# Patient Record
Sex: MALE | Race: OTHER | NOT HISPANIC OR LATINO
[De-identification: names, ages, dates, MRNs, and addresses within clinical notes are randomized per-mention and may not be internally consistent; named-entity substitution may affect disease eponyms.]

---

## 2020-08-22 ENCOUNTER — TRANSCRIPTION ENCOUNTER (OUTPATIENT)
Age: 25
End: 2020-08-22

## 2021-05-08 ENCOUNTER — APPOINTMENT (OUTPATIENT)
Dept: DISASTER EMERGENCY | Facility: OTHER | Age: 26
End: 2021-05-08

## 2021-05-10 ENCOUNTER — APPOINTMENT (OUTPATIENT)
Dept: DISASTER EMERGENCY | Facility: OTHER | Age: 26
End: 2021-05-10
Payer: COMMERCIAL

## 2021-05-10 PROCEDURE — 0012A: CPT

## 2022-02-13 ENCOUNTER — INPATIENT (INPATIENT)
Facility: HOSPITAL | Age: 27
LOS: 2 days | Discharge: HOME | End: 2022-02-16
Attending: INTERNAL MEDICINE | Admitting: INTERNAL MEDICINE
Payer: MEDICAID

## 2022-02-13 VITALS
WEIGHT: 229.94 LBS | HEIGHT: 72 IN | RESPIRATION RATE: 15 BRPM | TEMPERATURE: 98 F | DIASTOLIC BLOOD PRESSURE: 77 MMHG | OXYGEN SATURATION: 98 % | HEART RATE: 122 BPM | SYSTOLIC BLOOD PRESSURE: 115 MMHG

## 2022-02-13 DIAGNOSIS — I26.99 OTHER PULMONARY EMBOLISM WITHOUT ACUTE COR PULMONALE: ICD-10-CM

## 2022-02-13 LAB
ALBUMIN SERPL ELPH-MCNC: 3.4 G/DL — SIGNIFICANT CHANGE UP (ref 3.3–5)
ALP SERPL-CCNC: 87 U/L — SIGNIFICANT CHANGE UP (ref 40–120)
ALT FLD-CCNC: 58 U/L — SIGNIFICANT CHANGE UP (ref 12–78)
ANION GAP SERPL CALC-SCNC: 5 MMOL/L — SIGNIFICANT CHANGE UP (ref 5–17)
APPEARANCE UR: CLEAR — SIGNIFICANT CHANGE UP
APTT BLD: 32.3 SEC — SIGNIFICANT CHANGE UP (ref 27.5–35.5)
APTT BLD: >200 SEC — CRITICAL HIGH (ref 27.5–35.5)
AST SERPL-CCNC: 28 U/L — SIGNIFICANT CHANGE UP (ref 15–37)
BASOPHILS # BLD AUTO: 0.08 K/UL — SIGNIFICANT CHANGE UP (ref 0–0.2)
BASOPHILS NFR BLD AUTO: 0.7 % — SIGNIFICANT CHANGE UP (ref 0–2)
BILIRUB SERPL-MCNC: 0.7 MG/DL — SIGNIFICANT CHANGE UP (ref 0.2–1.2)
BILIRUB UR-MCNC: NEGATIVE — SIGNIFICANT CHANGE UP
BLD GP AB SCN SERPL QL: SIGNIFICANT CHANGE UP
BUN SERPL-MCNC: 13 MG/DL — SIGNIFICANT CHANGE UP (ref 7–23)
CALCIUM SERPL-MCNC: 9.4 MG/DL — SIGNIFICANT CHANGE UP (ref 8.5–10.1)
CHLORIDE SERPL-SCNC: 101 MMOL/L — SIGNIFICANT CHANGE UP (ref 96–108)
CK SERPL-CCNC: 115 U/L — SIGNIFICANT CHANGE UP (ref 26–308)
CO2 SERPL-SCNC: 30 MMOL/L — SIGNIFICANT CHANGE UP (ref 22–31)
COLOR SPEC: YELLOW — SIGNIFICANT CHANGE UP
CREAT SERPL-MCNC: 1.28 MG/DL — SIGNIFICANT CHANGE UP (ref 0.5–1.3)
D DIMER BLD IA.RAPID-MCNC: 5631 NG/ML DDU — HIGH
DIFF PNL FLD: NEGATIVE — SIGNIFICANT CHANGE UP
EOSINOPHIL # BLD AUTO: 0.39 K/UL — SIGNIFICANT CHANGE UP (ref 0–0.5)
EOSINOPHIL NFR BLD AUTO: 3.5 % — SIGNIFICANT CHANGE UP (ref 0–6)
FLUAV AG NPH QL: SIGNIFICANT CHANGE UP
FLUBV AG NPH QL: SIGNIFICANT CHANGE UP
GLUCOSE SERPL-MCNC: 87 MG/DL — SIGNIFICANT CHANGE UP (ref 70–99)
GLUCOSE UR QL: NEGATIVE MG/DL — SIGNIFICANT CHANGE UP
HCT VFR BLD CALC: 41.8 % — SIGNIFICANT CHANGE UP (ref 39–50)
HCT VFR BLD CALC: 44.7 % — SIGNIFICANT CHANGE UP (ref 39–50)
HGB BLD-MCNC: 13.9 G/DL — SIGNIFICANT CHANGE UP (ref 13–17)
HGB BLD-MCNC: 15 G/DL — SIGNIFICANT CHANGE UP (ref 13–17)
IMM GRANULOCYTES NFR BLD AUTO: 0.4 % — SIGNIFICANT CHANGE UP (ref 0–1.5)
INR BLD: 1.44 RATIO — HIGH (ref 0.88–1.16)
KETONES UR-MCNC: NEGATIVE — SIGNIFICANT CHANGE UP
LEUKOCYTE ESTERASE UR-ACNC: NEGATIVE — SIGNIFICANT CHANGE UP
LYMPHOCYTES # BLD AUTO: 1.66 K/UL — SIGNIFICANT CHANGE UP (ref 1–3.3)
LYMPHOCYTES # BLD AUTO: 14.9 % — SIGNIFICANT CHANGE UP (ref 13–44)
MCHC RBC-ENTMCNC: 28.9 PG — SIGNIFICANT CHANGE UP (ref 27–34)
MCHC RBC-ENTMCNC: 29.2 PG — SIGNIFICANT CHANGE UP (ref 27–34)
MCHC RBC-ENTMCNC: 33.3 G/DL — SIGNIFICANT CHANGE UP (ref 32–36)
MCHC RBC-ENTMCNC: 33.6 G/DL — SIGNIFICANT CHANGE UP (ref 32–36)
MCV RBC AUTO: 86.9 FL — SIGNIFICANT CHANGE UP (ref 80–100)
MCV RBC AUTO: 87.1 FL — SIGNIFICANT CHANGE UP (ref 80–100)
MONOCYTES # BLD AUTO: 0.71 K/UL — SIGNIFICANT CHANGE UP (ref 0–0.9)
MONOCYTES NFR BLD AUTO: 6.4 % — SIGNIFICANT CHANGE UP (ref 2–14)
NEUTROPHILS # BLD AUTO: 8.26 K/UL — HIGH (ref 1.8–7.4)
NEUTROPHILS NFR BLD AUTO: 74.1 % — SIGNIFICANT CHANGE UP (ref 43–77)
NITRITE UR-MCNC: NEGATIVE — SIGNIFICANT CHANGE UP
NRBC # BLD: 0 /100 WBCS — SIGNIFICANT CHANGE UP (ref 0–0)
NRBC # BLD: 0 /100 WBCS — SIGNIFICANT CHANGE UP (ref 0–0)
PH UR: 6 — SIGNIFICANT CHANGE UP (ref 5–8)
PLATELET # BLD AUTO: 318 K/UL — SIGNIFICANT CHANGE UP (ref 150–400)
PLATELET # BLD AUTO: 343 K/UL — SIGNIFICANT CHANGE UP (ref 150–400)
POTASSIUM SERPL-MCNC: 4.2 MMOL/L — SIGNIFICANT CHANGE UP (ref 3.5–5.3)
POTASSIUM SERPL-SCNC: 4.2 MMOL/L — SIGNIFICANT CHANGE UP (ref 3.5–5.3)
PROT SERPL-MCNC: 8.9 GM/DL — HIGH (ref 6–8.3)
PROT UR-MCNC: NEGATIVE MG/DL — SIGNIFICANT CHANGE UP
PROTHROM AB SERPL-ACNC: 16.4 SEC — HIGH (ref 10.6–13.6)
RBC # BLD: 4.81 M/UL — SIGNIFICANT CHANGE UP (ref 4.2–5.8)
RBC # BLD: 5.13 M/UL — SIGNIFICANT CHANGE UP (ref 4.2–5.8)
RBC # FLD: 11.4 % — SIGNIFICANT CHANGE UP (ref 10.3–14.5)
RBC # FLD: 11.4 % — SIGNIFICANT CHANGE UP (ref 10.3–14.5)
SARS-COV-2 RNA SPEC QL NAA+PROBE: SIGNIFICANT CHANGE UP
SODIUM SERPL-SCNC: 136 MMOL/L — SIGNIFICANT CHANGE UP (ref 135–145)
SP GR SPEC: 1.01 — SIGNIFICANT CHANGE UP (ref 1.01–1.02)
TROPONIN I, HIGH SENSITIVITY RESULT: 11.7 NG/L — SIGNIFICANT CHANGE UP
UROBILINOGEN FLD QL: NEGATIVE MG/DL — SIGNIFICANT CHANGE UP
WBC # BLD: 11.15 K/UL — HIGH (ref 3.8–10.5)
WBC # BLD: 11.23 K/UL — HIGH (ref 3.8–10.5)
WBC # FLD AUTO: 11.15 K/UL — HIGH (ref 3.8–10.5)
WBC # FLD AUTO: 11.23 K/UL — HIGH (ref 3.8–10.5)

## 2022-02-13 PROCEDURE — 74176 CT ABD & PELVIS W/O CONTRAST: CPT | Mod: 26,MA

## 2022-02-13 PROCEDURE — 93931 UPPER EXTREMITY STUDY: CPT | Mod: 26

## 2022-02-13 PROCEDURE — 71275 CT ANGIOGRAPHY CHEST: CPT | Mod: 26,MA

## 2022-02-13 PROCEDURE — 93010 ELECTROCARDIOGRAM REPORT: CPT

## 2022-02-13 PROCEDURE — 71046 X-RAY EXAM CHEST 2 VIEWS: CPT | Mod: 26

## 2022-02-13 PROCEDURE — 99285 EMERGENCY DEPT VISIT HI MDM: CPT

## 2022-02-13 RX ORDER — HEPARIN SODIUM 5000 [USP'U]/ML
INJECTION INTRAVENOUS; SUBCUTANEOUS
Qty: 25000 | Refills: 0 | Status: DISCONTINUED | OUTPATIENT
Start: 2022-02-13 | End: 2022-02-16

## 2022-02-13 RX ORDER — HEPARIN SODIUM 5000 [USP'U]/ML
INJECTION INTRAVENOUS; SUBCUTANEOUS
Qty: 25000 | Refills: 0 | Status: DISCONTINUED | OUTPATIENT
Start: 2022-02-13 | End: 2022-02-13

## 2022-02-13 RX ORDER — HEPARIN SODIUM 5000 [USP'U]/ML
8500 INJECTION INTRAVENOUS; SUBCUTANEOUS ONCE
Refills: 0 | Status: COMPLETED | OUTPATIENT
Start: 2022-02-13 | End: 2022-02-13

## 2022-02-13 RX ORDER — IBUPROFEN 200 MG
600 TABLET ORAL ONCE
Refills: 0 | Status: COMPLETED | OUTPATIENT
Start: 2022-02-13 | End: 2022-02-13

## 2022-02-13 RX ORDER — ACETAMINOPHEN 500 MG
650 TABLET ORAL EVERY 6 HOURS
Refills: 0 | Status: DISCONTINUED | OUTPATIENT
Start: 2022-02-13 | End: 2022-02-16

## 2022-02-13 RX ORDER — HEPARIN SODIUM 5000 [USP'U]/ML
8500 INJECTION INTRAVENOUS; SUBCUTANEOUS EVERY 6 HOURS
Refills: 0 | Status: DISCONTINUED | OUTPATIENT
Start: 2022-02-13 | End: 2022-02-16

## 2022-02-13 RX ORDER — HEPARIN SODIUM 5000 [USP'U]/ML
4000 INJECTION INTRAVENOUS; SUBCUTANEOUS EVERY 6 HOURS
Refills: 0 | Status: DISCONTINUED | OUTPATIENT
Start: 2022-02-13 | End: 2022-02-16

## 2022-02-13 RX ADMIN — Medication 600 MILLIGRAM(S): at 10:04

## 2022-02-13 RX ADMIN — HEPARIN SODIUM 1800 UNIT(S)/HR: 5000 INJECTION INTRAVENOUS; SUBCUTANEOUS at 19:49

## 2022-02-13 RX ADMIN — Medication 600 MILLIGRAM(S): at 09:03

## 2022-02-13 RX ADMIN — HEPARIN SODIUM 1800 UNIT(S)/HR: 5000 INJECTION INTRAVENOUS; SUBCUTANEOUS at 16:58

## 2022-02-13 RX ADMIN — HEPARIN SODIUM 0 UNIT(S)/HR: 5000 INJECTION INTRAVENOUS; SUBCUTANEOUS at 22:51

## 2022-02-13 RX ADMIN — HEPARIN SODIUM 1800 UNIT(S)/HR: 5000 INJECTION INTRAVENOUS; SUBCUTANEOUS at 14:08

## 2022-02-13 RX ADMIN — HEPARIN SODIUM 1800 UNIT(S)/HR: 5000 INJECTION INTRAVENOUS; SUBCUTANEOUS at 23:58

## 2022-02-13 RX ADMIN — HEPARIN SODIUM 8500 UNIT(S): 5000 INJECTION INTRAVENOUS; SUBCUTANEOUS at 14:07

## 2022-02-13 NOTE — ED ADULT NURSE REASSESSMENT NOTE - NS ED NURSE REASSESS COMMENT FT1
Call from Celest[place] in CT: scan was completed but toward end IV leaking from insertion site. Will evaluated on return to unit.
IV site patent and intact. No swelling or sign of infiltration. Second IV site initiated.
Lab results reviewed with patient by Dr. Wahl. Patient's questions and concerns addressed. Patient pending CTA.
Received patient from CT scan with warm compress on IV site on right arm. Patient denies any discomfort on site. CTA results explained by Dr. Wahl. Patient is placed on cardiac monitor. VS stable at this time.

## 2022-02-13 NOTE — H&P ADULT - NSHPPHYSICALEXAM_GEN_ALL_CORE
PHYSICAL EXAM:    GENERAL: NAD, well-groomed, well-developed  HEAD:  Atraumatic, Normocephalic  EYES: EOMI, PERRLA, conjunctiva and sclera clear  ENMT: No tonsillar erythema, exudates, or enlargement; Moist mucous membranes, , No lesions  NECK: Supple, No JVD, Normal thyroid  NERVOUS SYSTEM:  Alert & Oriented X4, ; Motor Strength 5/5 B/L upper and lower extremities; DTRs 2+ intact and symmetric  CHEST/LUNG: Clear  bilaterally; No rales, rhonchi, wheezing, or rubs  HEART: Regular rate and rhythm; No murmurs, rubs, or gallops  ABDOMEN: Soft, Nontender, Nondistended; no  masses Bowel sounds present  EXTREMITIES:  + Peripheral Pulses, No clubbing, cyanosis, or edema  LYMPH: No lymphadenopathy noted   RECTAL: deferred  SKIN: No rashes or lesions

## 2022-02-13 NOTE — H&P ADULT - HISTORY OF PRESENT ILLNESS
27 yo M w/PMH of Kidney Stones (2016), presents to the ED for right flank pain for the past x5 days. Pt reports pain is an intermittent, cramping pain, 6/10 in severity. Pt states the pain is similar to when he had kidney stones in the past. Denies any injuries, falls, or trauma. Pt took Advil for the pain last night and drank water. Pt also reports having intermittent episodes of increased pain in the same area. Denies any dysuria, hematuria, or fever, CP, SOB, or vomiting.  reported  rt  sided  pleuritic  CP  underwent  CT  angio  which  was  +  for  PE  admitted  for further w/u  and Rx

## 2022-02-13 NOTE — ED PROVIDER NOTE - OBJECTIVE STATEMENT
25 yo M w/PMH of Kidney Stones (2016), presents to the ED for right flank pain for the past x5 days. Pt reports pain is an intermittent, cramping pain, 6/10 in severity. Pt states the pain is similar to when he had kidney stones in the past. Denies any injuries, falls, or trauma. Pt took Advil for the pain last night and drank water. Pt also reports having intermittent episodes of increased pain in the same area. Denies any dysuria, hematuria, or fever, CP, SOB, or vomiting.

## 2022-02-13 NOTE — ED PROVIDER NOTE - CLINICAL SUMMARY MEDICAL DECISION MAKING FREE TEXT BOX
Pt w/ flank pain with h/o kidney stones, possible recurrence. Will check CT to r/o. Due to pt intermittent feeling of increased pain, will do chest x-ray for pneumonia and swab for Covid.

## 2022-02-13 NOTE — ED ADULT TRIAGE NOTE - CHIEF COMPLAINT QUOTE
Right flank pains without nausea or vomiting since Tuesday. Denies abdominal pains. H/O Kidney stones.

## 2022-02-13 NOTE — H&P ADULT - ASSESSMENT
IMPROVE VTE Individual Risk Assessment    RISK                                                                Points    [  ] Previous VTE                                                  3    [  ] Thrombophilia                                               2    [  ] Lower limb paralysis                                      2        (unable to hold up >15 seconds)      [  ] Current Cancer                                              2         (within 6 months)    [  ] Immobilization > 24 hrs                                1    [x  ] ICU/CCU stay > 24 hours                              1    [  ] Age > 60                                                      1    IMPROVE VTE Score _______1__    IMPROVE Score 0-1: Low Risk, No VTE prophylaxis required for most patients, encourage ambulation.   IMPROVE Score 2-3: At risk, pharmacologic VTE prophylaxis is indicated for most patients (in the absence of a contraindication)  IMPROVE Score > or = 4: High Risk, pharmacologic VTE prophylaxis is indicated for most patients (in the absence of a contraindication)

## 2022-02-13 NOTE — PATIENT PROFILE ADULT - FALL HARM RISK - UNIVERSAL INTERVENTIONS
Bed in lowest position, wheels locked, appropriate side rails in place/Call bell, personal items and telephone in reach/Instruct patient to call for assistance before getting out of bed or chair/Non-slip footwear when patient is out of bed/[place] to call system/Physically safe environment - no spills, clutter or unnecessary equipment/Purposeful Proactive Rounding/Room/bathroom lighting operational, light cord in reach

## 2022-02-13 NOTE — ED ADULT NURSE NOTE - OBJECTIVE STATEMENT
Pt arrived with right abd and flank pain since Tuesday H/O Stones, elevated BP noted, had been nausea

## 2022-02-13 NOTE — H&P ADULT - NSHPLABSRESULTS_GEN_ALL_CORE
LABS:                        15.0   11.15 )-----------( 343      ( 2022 09:36 )             44.7     02-    136  |  101  |  13  ----------------------------<  87  4.2   |  30  |  1.28    Ca    9.4      2022 09:36    TPro  8.9<H>  /  Alb  3.4  /  TBili  0.7  /  DBili  x   /  AST  28  /  ALT  58  /  AlkPhos  87  02-13    PT/INR - ( 2022 13:22 )   PT: 16.4 sec;   INR: 1.44 ratio         PTT - ( 2022 13:22 )  PTT:32.3 sec  Urinalysis Basic - ( 2022 11:24 )    Color: Yellow / Appearance: Clear / S.015 / pH: x  Gluc: x / Ketone: Negative  / Bili: Negative / Urobili: Negative mg/dL   Blood: x / Protein: Negative mg/dL / Nitrite: Negative   Leuk Esterase: Negative / RBC: x / WBC x   Sq Epi: x / Non Sq Epi: x / Bacteria: x      < from: CT Angio Chest PE Protocol w/ IV Cont (22 @ 12:02) >    Large, near occlusive acute pulmonary embolism in the right main   pulmonary artery extending into the lobar, segmental and subsegmental   branches throughout the right lung. No imaging evidence of right heart   strain; consider correlation with echocardiogram.    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 Small right pleural   effusion.    < end of copied text >

## 2022-02-14 LAB
ALBUMIN SERPL ELPH-MCNC: 2.8 G/DL — LOW (ref 3.3–5)
ALP SERPL-CCNC: 72 U/L — SIGNIFICANT CHANGE UP (ref 40–120)
ALT FLD-CCNC: 44 U/L — SIGNIFICANT CHANGE UP (ref 12–78)
ANION GAP SERPL CALC-SCNC: 5 MMOL/L — SIGNIFICANT CHANGE UP (ref 5–17)
APTT BLD: 152.5 SEC — CRITICAL HIGH (ref 27.5–35.5)
APTT BLD: 94.5 SEC — HIGH (ref 27.5–35.5)
AST SERPL-CCNC: 18 U/L — SIGNIFICANT CHANGE UP (ref 15–37)
BILIRUB SERPL-MCNC: 0.4 MG/DL — SIGNIFICANT CHANGE UP (ref 0.2–1.2)
BUN SERPL-MCNC: 14 MG/DL — SIGNIFICANT CHANGE UP (ref 7–23)
CALCIUM SERPL-MCNC: 8.6 MG/DL — SIGNIFICANT CHANGE UP (ref 8.5–10.1)
CHLORIDE SERPL-SCNC: 106 MMOL/L — SIGNIFICANT CHANGE UP (ref 96–108)
CO2 SERPL-SCNC: 28 MMOL/L — SIGNIFICANT CHANGE UP (ref 22–31)
CREAT SERPL-MCNC: 0.96 MG/DL — SIGNIFICANT CHANGE UP (ref 0.5–1.3)
GLUCOSE SERPL-MCNC: 87 MG/DL — SIGNIFICANT CHANGE UP (ref 70–99)
HCT VFR BLD CALC: 40.8 % — SIGNIFICANT CHANGE UP (ref 39–50)
HGB BLD-MCNC: 13.5 G/DL — SIGNIFICANT CHANGE UP (ref 13–17)
HIV 1+2 AB+HIV1 P24 AG SERPL QL IA: SIGNIFICANT CHANGE UP
LACTATE SERPL-SCNC: 0.5 MMOL/L — LOW (ref 0.7–2)
MCHC RBC-ENTMCNC: 29.3 PG — SIGNIFICANT CHANGE UP (ref 27–34)
MCHC RBC-ENTMCNC: 33.1 G/DL — SIGNIFICANT CHANGE UP (ref 32–36)
MCV RBC AUTO: 88.5 FL — SIGNIFICANT CHANGE UP (ref 80–100)
NRBC # BLD: 0 /100 WBCS — SIGNIFICANT CHANGE UP (ref 0–0)
NT-PROBNP SERPL-SCNC: <5 PG/ML — SIGNIFICANT CHANGE UP (ref 0–125)
PLATELET # BLD AUTO: 288 K/UL — SIGNIFICANT CHANGE UP (ref 150–400)
POTASSIUM SERPL-MCNC: 4 MMOL/L — SIGNIFICANT CHANGE UP (ref 3.5–5.3)
POTASSIUM SERPL-SCNC: 4 MMOL/L — SIGNIFICANT CHANGE UP (ref 3.5–5.3)
PROT C ACT/NOR PPP: 98 % — SIGNIFICANT CHANGE UP (ref 74–150)
PROT S FREE AG PPP IA-ACNC: 70 % — SIGNIFICANT CHANGE UP (ref 67–141)
PROT SERPL-MCNC: 7.5 GM/DL — SIGNIFICANT CHANGE UP (ref 6–8.3)
RBC # BLD: 4.61 M/UL — SIGNIFICANT CHANGE UP (ref 4.2–5.8)
RBC # FLD: 11.5 % — SIGNIFICANT CHANGE UP (ref 10.3–14.5)
SODIUM SERPL-SCNC: 139 MMOL/L — SIGNIFICANT CHANGE UP (ref 135–145)
WBC # BLD: 11.93 K/UL — HIGH (ref 3.8–10.5)
WBC # FLD AUTO: 11.93 K/UL — HIGH (ref 3.8–10.5)

## 2022-02-14 PROCEDURE — 93306 TTE W/DOPPLER COMPLETE: CPT | Mod: 26

## 2022-02-14 RX ORDER — LANOLIN ALCOHOL/MO/W.PET/CERES
3 CREAM (GRAM) TOPICAL AT BEDTIME
Refills: 0 | Status: DISCONTINUED | OUTPATIENT
Start: 2022-02-14 | End: 2022-02-16

## 2022-02-14 RX ADMIN — HEPARIN SODIUM 1500 UNIT(S)/HR: 5000 INJECTION INTRAVENOUS; SUBCUTANEOUS at 10:08

## 2022-02-14 RX ADMIN — Medication 3 MILLIGRAM(S): at 22:24

## 2022-02-14 RX ADMIN — HEPARIN SODIUM 1500 UNIT(S)/HR: 5000 INJECTION INTRAVENOUS; SUBCUTANEOUS at 19:53

## 2022-02-14 RX ADMIN — HEPARIN SODIUM 1800 UNIT(S)/HR: 5000 INJECTION INTRAVENOUS; SUBCUTANEOUS at 04:49

## 2022-02-14 RX ADMIN — HEPARIN SODIUM 1800 UNIT(S)/HR: 5000 INJECTION INTRAVENOUS; SUBCUTANEOUS at 08:35

## 2022-02-14 RX ADMIN — HEPARIN SODIUM 0 UNIT(S)/HR: 5000 INJECTION INTRAVENOUS; SUBCUTANEOUS at 09:08

## 2022-02-14 RX ADMIN — HEPARIN SODIUM 1500 UNIT(S)/HR: 5000 INJECTION INTRAVENOUS; SUBCUTANEOUS at 19:45

## 2022-02-14 NOTE — CONSULT NOTE ADULT - PROBLEM SELECTOR RECOMMENDATION 9
- unprovoked PE  - Anticoagulation  - hypercaogualble work-up at later date as would not be relaible in setting of VTE and ANticoagulation  - ECHO

## 2022-02-14 NOTE — CONSULT NOTE ADULT - SUBJECTIVE AND OBJECTIVE BOX
Patient is a 26y old  Male who presents with a chief complaint of pe (2022 15:09)    HPI:  27 yo M w/PMH of Kidney Stones (2016), presents to the ED for right/ chest/ flank pain for the past x5 days. Pain is an intermittent, cramping pain, 6/10 in severity,  similar  to when he had kidney stones in the past. Denies any injuries, falls, or trauma. Pt took Advil for the pain last night and drank water. Denies any dysuria, hematuria, or fever, CP, SOB, or vomiting. No leg swelling, no long travel.  CTA chest showed PE in right pulmonary artery. No family history  of VTE  He is a full time student, on no regular meds.    Denies tobacco or substance abuse    PAST MEDICAL & SURGICAL HISTORY:  Kidney stones    SOCIAL HISTORY: BMI (kg/m2): 28.5 ( @ 17:29)    Allergies  No Known Allergies    MEDICATIONS  (STANDING):  heparin  Infusion.  Unit(s)/Hr (18 mL/Hr) IV Continuous <Continuous>    MEDICATIONS  (PRN):  acetaminophen     Tablet .. 650 milliGRAM(s) Oral every 6 hours PRN Mild Pain (1 - 3)  heparin   Injectable 8500 Unit(s) IV Push every 6 hours PRN For aPTT less than 40  heparin   Injectable 4000 Unit(s) IV Push every 6 hours PRN For aPTT between 40 - 57    Vital Signs Last 24 Hrs  T(C): 36.4 (2022 17:29), Max: 37.1 (2022 10:07)  T(F): 97.5 (2022 17:29), Max: 98.7 (2022 10:07)  HR: 86 (2022 17:29) (68 - 122)  BP: 102/69 (2022 17:29) (102/69 - 120/67)  BP(mean): --  RR: 18 (2022 17:29) (15 - 19)  SpO2: 100% (2022 17:29) (97% - 100%)    PHYSICAL EXAM:  GEN:         Awake, responsive and comfortable.  HEENT:    Normal.    RESP:        no w  CVS:          Regular rate and rhythm.   ABD:         Soft, non-tender, non-distended;   :             No costovertebral angle tenderness  SKIN:           Warm and dry.  EXTR:            No clubbing, cyanosis or edema  CNS:              Intact sensory and motor function.  PSYCH:        cooperative, no anxiety or depression    LABS:                        15.0   11.15 )-----------( 343      ( 2022 09:36 )             44.7         136  |  101  |  13  ----------------------------<  87  4.2   |  30  |  1.28    Ca    9.4      2022 09:36    TPro  8.9<H>  /  Alb  3.4  /  TBili  0.7  /  DBili  x   /  AST  28  /  ALT  58  /  AlkPhos  87      PT/INR - ( 2022 13:22 )   PT: 16.4 sec;   INR: 1.44 ratio      PTT - ( 2022 13:22 )  PTT:32.3 sec    Urinalysis Basic - ( 2022 11:24 )    Color: Yellow / Appearance: Clear / S.015 / pH: x  Gluc: x / Ketone: Negative  / Bili: Negative / Urobili: Negative mg/dL   Blood: x / Protein: Negative mg/dL / Nitrite: Negative   Leuk Esterase: Negative / RBC: x / WBC x   Sq Epi: x / Non Sq Epi: x / Bacteria: x    EKG: sinus tachycardia.    RADIOLOGY & ADDITIONAL STUDIES:  < from: CT Angio Chest PE Protocol w/ IV Cont (22 @ 12:02) >  ACC: [id number] [facility]  [facility]                          PROCEDURE DATE:  2022      INTERPRETATION:  CLINICAL INDICATION: Pleuritic chest pain, evaluate for   pulmonary embolism. Elevated d-dimer.    TECHNIQUE: A volumetric acquisition of the chest was obtained from the   thoracic inlet to the upper abdomen during dynamic administration of 75cc   Omnipaque 350 intravenous contrast according to the PE protocol. 3D MIP   images were provided.    COMPARISON: None    FINDINGS:  CTA: The study is technically adequate with a good contrast bolus to the   pulmonary arteries. There is a large, near occlusive filling defect   within the right main pulmonary artery extending into the lobar,   segmental and subsegmental branches throughout the right lung, consistent   with acute pulmonary embolism. There is resulting poor flow within the   right superior and inferior pulmonary veins. The cardiac chambers appear   normal in size. The RV: LV ratio is less than 1. There isno pericardial   effusion. The thoracic aorta and pulmonary artery are normal in size.   Aortic origin of the left vertebral artery, normal variant.    Lungs/Airways/Pleura: Peripheral consolidation in the posterior basal   right lower lobe, most consistent with infarct in this clinical setting.   There is a small right pleural effusion. The left lung is clear. The   central airways are patent    Mediastinum/Lymph nodes: No thoracic adenopathy. Unremarkable thyroid.   Mild residual thymic tissue.    Upper Abdomen: Please see earlier same day, separately dictated abdominal   CT report for findings in the abdomen.    Bones and Soft Tissues: No aggressive osseous lesions.    IMPRESSION:  Large, near occlusive acute pulmonary embolism in the right main   pulmonary artery extending into the lobar, segmental and subsegmental   branches throughout the right lung. No imaging evidence of right heart   strain; consider correlation with echocardiogram.    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 Small right pleural   effusion.    Findings were discussed with Dr. Wahl at 12:10 PM on 2022.    RHIANNON GARCIA M.D., Attending Radiologist  This document has been electronically signed. 2022 12:25PM    ASSESSMENT AND PLAN:  ·	Chest pain.  ·	Large right pulmonary embolism.  ·	Nephrolithiasis.    SPO2 stable on room air . Vital signs also stable.  Continue IV heparin .  Contacted Cardiology Fellow Dr. Clemente via transfer center( [phone] ) for possibility of catheter directed embolectomy as it is large, near occlusive acute pulmonary embolism in the right main pulmonary artery extending into the lobar, segmental and subsegmental   branches throughout the right lung. Also the pt is young.  Dr. Clemente will discuss with his team and get back to me.  will order lactate, proBNP and Echo as asked by Dr. Clemente.  ADDENDUM: Dr. Clemente called back after discussing with his team and advised to continue full anticoagulation, get Echo, and call them back if clinical condition changes or echo shows significant abnormality.
Reason for Consultation: PE    HPI: Patient is a 26y Male seen on consultatioin for the evaluation and management of PE    27 yo M w/PMH of Kidney Stones (2016), presents to the ED for right/ chest/ flank pain for the past x5 days. Pain is an intermittent, cramping pain, 6/10 in severity,  similar  to when he had kidney stones in the past. Denies any injuries, falls, or trauma. Pt took Advil for the pain last night and drank water. Denies any dysuria, hematuria, or fever, CP, SOB, or vomiting. No leg swelling, no long travel.  CTA chest showed PE in right pulmonary artery. No family history  of VTE  He is a full time student, on no regular meds.    Denies tobacco or substance abuse      PAST MEDICAL & SURGICAL HISTORY:  Kidney stones      MEDICATIONS  (STANDING):  heparin  Infusion.  Unit(s)/Hr (18 mL/Hr) IV Continuous <Continuous>    MEDICATIONS  (PRN):  acetaminophen     Tablet .. 650 milliGRAM(s) Oral every 6 hours PRN Mild Pain (1 - 3)  heparin   Injectable 8500 Unit(s) IV Push every 6 hours PRN For aPTT less than 40  heparin   Injectable 4000 Unit(s) IV Push every 6 hours PRN For aPTT between 40 - 57      Allergies    No Known Allergies    Intolerances        SOCIAL HISTORY:    Smoking Status: no  Alcohol: no  Occupation: student            Vital Signs Last 24 Hrs  T(C): 36.4 (2022 04:39), Max: 36.8 (2022 15:52)  T(F): 97.6 (2022 04:39), Max: 98.2 (2022 15:52)  HR: 89 (2022 04:39) (68 - 97)  BP: 105/69 (2022 04:39) (97/66 - 120/67)  BP(mean): --  RR: 19 (2022 04:39) (18 - 19)  SpO2: 98% (2022 04:39) (97% - 100%)    PHYSICAL EXAM:    general - AAO x 3  HEENT - No Icterus  CVS - RRR  RS - AE B/L  Abd - soft, NT  Ext - Pulses +        LABS:                        13.5   11.93 )-----------( 288      ( 2022 06:35 )             40.8     02-14    139  |  106  |  14  ----------------------------<  87  4.0   |  28  |  0.96    Ca    8.6      2022 06:35    TPro  7.5  /  Alb  2.8<L>  /  TBili  0.4  /  DBili  x   /  AST  18  /  ALT  44  /  AlkPhos  72  02-14    PT/INR - ( 2022 13:22 )   PT: 16.4 sec;   INR: 1.44 ratio         PTT - ( 2022 06:35 )  PTT:152.5 sec  Urinalysis Basic - ( 2022 11:24 )    Color: Yellow / Appearance: Clear / S.015 / pH: x  Gluc: x / Ketone: Negative  / Bili: Negative / Urobili: Negative mg/dL   Blood: x / Protein: Negative mg/dL / Nitrite: Negative   Leuk Esterase: Negative / RBC: x / WBC x   Sq Epi: x / Non Sq Epi: x / Bacteria: x

## 2022-02-15 LAB
ANION GAP SERPL CALC-SCNC: 4 MMOL/L — LOW (ref 5–17)
APTT BLD: 92.5 SEC — HIGH (ref 27.5–35.5)
BUN SERPL-MCNC: 15 MG/DL — SIGNIFICANT CHANGE UP (ref 7–23)
CALCIUM SERPL-MCNC: 9 MG/DL — SIGNIFICANT CHANGE UP (ref 8.5–10.1)
CARDIOLIPIN AB SER-ACNC: NEGATIVE — SIGNIFICANT CHANGE UP
CHLORIDE SERPL-SCNC: 105 MMOL/L — SIGNIFICANT CHANGE UP (ref 96–108)
CO2 SERPL-SCNC: 27 MMOL/L — SIGNIFICANT CHANGE UP (ref 22–31)
CREAT SERPL-MCNC: 0.88 MG/DL — SIGNIFICANT CHANGE UP (ref 0.5–1.3)
GLUCOSE SERPL-MCNC: 101 MG/DL — HIGH (ref 70–99)
HCT VFR BLD CALC: 41.4 % — SIGNIFICANT CHANGE UP (ref 39–50)
HGB BLD-MCNC: 13.6 G/DL — SIGNIFICANT CHANGE UP (ref 13–17)
MCHC RBC-ENTMCNC: 28.9 PG — SIGNIFICANT CHANGE UP (ref 27–34)
MCHC RBC-ENTMCNC: 32.9 G/DL — SIGNIFICANT CHANGE UP (ref 32–36)
MCV RBC AUTO: 88.1 FL — SIGNIFICANT CHANGE UP (ref 80–100)
NRBC # BLD: 0 /100 WBCS — SIGNIFICANT CHANGE UP (ref 0–0)
PLATELET # BLD AUTO: 287 K/UL — SIGNIFICANT CHANGE UP (ref 150–400)
POTASSIUM SERPL-MCNC: 4 MMOL/L — SIGNIFICANT CHANGE UP (ref 3.5–5.3)
POTASSIUM SERPL-SCNC: 4 MMOL/L — SIGNIFICANT CHANGE UP (ref 3.5–5.3)
RBC # BLD: 4.7 M/UL — SIGNIFICANT CHANGE UP (ref 4.2–5.8)
RBC # FLD: 11.4 % — SIGNIFICANT CHANGE UP (ref 10.3–14.5)
SODIUM SERPL-SCNC: 136 MMOL/L — SIGNIFICANT CHANGE UP (ref 135–145)
WBC # BLD: 10.51 K/UL — HIGH (ref 3.8–10.5)
WBC # FLD AUTO: 10.51 K/UL — HIGH (ref 3.8–10.5)

## 2022-02-15 RX ADMIN — HEPARIN SODIUM 1500 UNIT(S)/HR: 5000 INJECTION INTRAVENOUS; SUBCUTANEOUS at 19:32

## 2022-02-15 RX ADMIN — HEPARIN SODIUM 1500 UNIT(S)/HR: 5000 INJECTION INTRAVENOUS; SUBCUTANEOUS at 03:11

## 2022-02-15 RX ADMIN — HEPARIN SODIUM 1500 UNIT(S)/HR: 5000 INJECTION INTRAVENOUS; SUBCUTANEOUS at 13:33

## 2022-02-16 ENCOUNTER — TRANSCRIPTION ENCOUNTER (OUTPATIENT)
Age: 27
End: 2022-02-16

## 2022-02-16 VITALS
RESPIRATION RATE: 16 BRPM | SYSTOLIC BLOOD PRESSURE: 114 MMHG | TEMPERATURE: 99 F | OXYGEN SATURATION: 98 % | HEART RATE: 94 BPM | DIASTOLIC BLOOD PRESSURE: 78 MMHG

## 2022-02-16 LAB
ALBUMIN SERPL ELPH-MCNC: 2.8 G/DL — LOW (ref 3.3–5)
ALP SERPL-CCNC: 75 U/L — SIGNIFICANT CHANGE UP (ref 40–120)
ALT FLD-CCNC: 91 U/L — HIGH (ref 12–78)
ANION GAP SERPL CALC-SCNC: 3 MMOL/L — LOW (ref 5–17)
APTT BLD: 100.3 SEC — HIGH (ref 27.5–35.5)
AST SERPL-CCNC: 42 U/L — HIGH (ref 15–37)
BILIRUB SERPL-MCNC: 0.4 MG/DL — SIGNIFICANT CHANGE UP (ref 0.2–1.2)
BUN SERPL-MCNC: 10 MG/DL — SIGNIFICANT CHANGE UP (ref 7–23)
CALCIUM SERPL-MCNC: 9.1 MG/DL — SIGNIFICANT CHANGE UP (ref 8.5–10.1)
CHLORIDE SERPL-SCNC: 104 MMOL/L — SIGNIFICANT CHANGE UP (ref 96–108)
CO2 SERPL-SCNC: 30 MMOL/L — SIGNIFICANT CHANGE UP (ref 22–31)
CREAT SERPL-MCNC: 0.9 MG/DL — SIGNIFICANT CHANGE UP (ref 0.5–1.3)
GLUCOSE BLDC GLUCOMTR-MCNC: 105 MG/DL — HIGH (ref 70–99)
GLUCOSE SERPL-MCNC: 85 MG/DL — SIGNIFICANT CHANGE UP (ref 70–99)
HCT VFR BLD CALC: 44 % — SIGNIFICANT CHANGE UP (ref 39–50)
HGB BLD-MCNC: 14.4 G/DL — SIGNIFICANT CHANGE UP (ref 13–17)
MCHC RBC-ENTMCNC: 28.5 PG — SIGNIFICANT CHANGE UP (ref 27–34)
MCHC RBC-ENTMCNC: 32.7 G/DL — SIGNIFICANT CHANGE UP (ref 32–36)
MCV RBC AUTO: 87 FL — SIGNIFICANT CHANGE UP (ref 80–100)
NRBC # BLD: 0 /100 WBCS — SIGNIFICANT CHANGE UP (ref 0–0)
PLATELET # BLD AUTO: 297 K/UL — SIGNIFICANT CHANGE UP (ref 150–400)
POTASSIUM SERPL-MCNC: 4.1 MMOL/L — SIGNIFICANT CHANGE UP (ref 3.5–5.3)
POTASSIUM SERPL-SCNC: 4.1 MMOL/L — SIGNIFICANT CHANGE UP (ref 3.5–5.3)
PROT SERPL-MCNC: 7.8 GM/DL — SIGNIFICANT CHANGE UP (ref 6–8.3)
RBC # BLD: 5.06 M/UL — SIGNIFICANT CHANGE UP (ref 4.2–5.8)
RBC # FLD: 11.5 % — SIGNIFICANT CHANGE UP (ref 10.3–14.5)
SODIUM SERPL-SCNC: 137 MMOL/L — SIGNIFICANT CHANGE UP (ref 135–145)
WBC # BLD: 8.04 K/UL — SIGNIFICANT CHANGE UP (ref 3.8–10.5)
WBC # FLD AUTO: 8.04 K/UL — SIGNIFICANT CHANGE UP (ref 3.8–10.5)

## 2022-02-16 RX ORDER — APIXABAN 2.5 MG/1
1 TABLET, FILM COATED ORAL
Qty: 60 | Refills: 0
Start: 2022-02-16 | End: 2022-03-17

## 2022-02-16 RX ORDER — APIXABAN 2.5 MG/1
10 TABLET, FILM COATED ORAL
Refills: 0 | Status: DISCONTINUED | OUTPATIENT
Start: 2022-02-16 | End: 2022-02-16

## 2022-02-16 RX ORDER — APIXABAN 2.5 MG/1
2 TABLET, FILM COATED ORAL
Qty: 28 | Refills: 0
Start: 2022-02-16 | End: 2022-02-22

## 2022-02-16 RX ADMIN — HEPARIN SODIUM 1500 UNIT(S)/HR: 5000 INJECTION INTRAVENOUS; SUBCUTANEOUS at 07:22

## 2022-02-16 RX ADMIN — APIXABAN 10 MILLIGRAM(S): 2.5 TABLET, FILM COATED ORAL at 08:41

## 2022-02-16 RX ADMIN — HEPARIN SODIUM 1500 UNIT(S)/HR: 5000 INJECTION INTRAVENOUS; SUBCUTANEOUS at 05:16

## 2022-02-16 NOTE — DISCHARGE NOTE NURSING/CASE MANAGEMENT/SOCIAL WORK - NSDCPEFALRISK_GEN_ALL_CORE
For information on Fall & Injury Prevention, visit: [email or web address] OR  [email or web address] OR  https://www.cdc.gov/steadi/patient.html

## 2022-02-16 NOTE — DISCHARGE NOTE PROVIDER - CARE PROVIDER_API CALL
TYRESE,   Phone: (   )    -  Fax: (   )    -  Follow Up Time:     Alisa Turner)  Hematology; Internal Medicine; Medical Oncology  [address], [address]  [place] [address]  Phone: [phone]  Fax: [phone]  Follow Up Time:     linda,   Phone: (   )    -  Fax: (   )    -  Follow Up Time:

## 2022-02-16 NOTE — DISCHARGE NOTE NURSING/CASE MANAGEMENT/SOCIAL WORK - PATIENT PORTAL LINK FT
You can access the FollowMyHealth Patient Portal offered by [facility] by registering at the following website: [email or web address]. By joining [facility]’s FollowMyHealth portal, you will also be able to view your health information using other applications (apps) compatible with our system.

## 2022-02-16 NOTE — DISCHARGE NOTE PROVIDER - PROVIDER TOKENS
FREE:[LAST:[PMD],PHONE:[(   )    -],FAX:[(   )    -]],PROVIDER:[TOKEN:[[id number]]],FREE:[LAST:[linda],PHONE:[(   )    -],FAX:[(   )    -]]

## 2022-02-16 NOTE — PROGRESS NOTE ADULT - SUBJECTIVE AND OBJECTIVE BOX
INTERVAL HPI/OVERNIGHT EVENTS:        REVIEW OF SYSTEMS:  CONSTITUTIONAL:  no  complaints    NECK: No pain or stiffnes  RESPIRATORY: No SOB   CARDIOVASCULAR: No chest pain, palpitations, dizziness,   GASTROINTESTINAL: No abdominal pain. No nausea, vomiting,   NEUROLOGICAL: No headaches, no  blurry  vision no  dizziness  SKIN: No itching,   MUSCULOSKELETAL: No pain    MEDICATION:  acetaminophen     Tablet .. 650 milliGRAM(s) Oral every 6 hours PRN  heparin   Injectable 8500 Unit(s) IV Push every 6 hours PRN  heparin   Injectable 4000 Unit(s) IV Push every 6 hours PRN  heparin  Infusion.  Unit(s)/Hr IV Continuous <Continuous>    Vital Signs Last 24 Hrs  T(C): 36.4 (2022 04:39), Max: 37.1 (2022 10:07)  T(F): 97.6 (2022 04:39), Max: 98.7 (2022 10:07)  HR: 89 (2022 04:39) (68 - 97)  BP: 105/69 (2022 04:39) (97/66 - 120/67)  BP(mean): --  RR: 19 (2022 04:39) (18 - 19)  SpO2: 98% (2022 04:39) (97% - 100%)    PHYSICAL EXAM:  GENERAL: NAD, well-groomed, well-developed  HEENT : Conjuntivae  clear sclerae anicteric  NECK: Supple, No JVD, Normal thyroid  NERVOUS SYSTEM:  Alert oriented   no  focal  deficits;   CHEST/LUNG: Clear    HEART: Regular rate and rhythm; No murmurs, rubs, or gallops  ABDOMEN: Soft, Nontender, Nondistended; Bowel sounds present  EXTREMITIES:  no  edema no  tenderness  SKIN: No rashes   LABS:                        13.5   11.93 )-----------( 288      ( 2022 06:35 )             40.8     02-14    139  |  106  |  14  ----------------------------<  87  4.0   |  28  |  0.96    Ca    8.6      2022 06:35    TPro  7.5  /  Alb  2.8<L>  /  TBili  0.4  /  DBili  x   /  AST  18  /  ALT  44  /  AlkPhos  72  02-14    PT/INR - ( 2022 13:22 )   PT: 16.4 sec;   INR: 1.44 ratio         PTT - ( 2022 06:35 )  PTT:152.5 sec  Urinalysis Basic - ( 2022 11:24 )    Color: Yellow / Appearance: Clear / S.015 / pH: x  Gluc: x / Ketone: Negative  / Bili: Negative / Urobili: Negative mg/dL   Blood: x / Protein: Negative mg/dL / Nitrite: Negative   Leuk Esterase: Negative / RBC: x / WBC x   Sq Epi: x / Non Sq Epi: x / Bacteria: x      CAPILLARY BLOOD GLUCOSE          RADIOLOGY & ADDITIONAL TESTS:    Imaging reports  Personally Reviewed:  [ ] YES  [ ] NO    Consultant(s) Notes Reviewed:  [ x] YES  [ ] NO    Care Discussed with Consultants/Other Providers [x ] YES  [ ] NO    Problem/Plan - 1:  ·  Problem: Pulmonary embolism.   ·  Plan: heparin  check  venous  doppler  thrombophilia studies  for  pulmonary  eval  appreciated  for  hematology  eval  for  TTE  
    INTERVAL HPI/OVERNIGHT EVENTS:        REVIEW OF SYSTEMS:  CONSTITUTIONAL:  no  complaints    NECK: No pain or stiffnes  RESPIRATORY: No SOB   CARDIOVASCULAR: No chest pain, palpitations, dizziness,   GASTROINTESTINAL: No abdominal pain. No nausea, vomiting,   NEUROLOGICAL: No headaches, no  blurry  vision no  dizziness  SKIN: No itching,   MUSCULOSKELETAL: No pain    MEDICATION:  acetaminophen     Tablet .. 650 milliGRAM(s) Oral every 6 hours PRN  heparin   Injectable 8500 Unit(s) IV Push every 6 hours PRN  heparin   Injectable 4000 Unit(s) IV Push every 6 hours PRN  heparin  Infusion.  Unit(s)/Hr IV Continuous <Continuous>  melatonin 3 milliGRAM(s) Oral at bedtime PRN    Vital Signs Last 24 Hrs  T(C): 37.1 (15 Feb 2022 04:36), Max: 37.2 (2022 13:58)  T(F): 98.7 (15 Feb 2022 04:36), Max: 99 (2022 13:58)  HR: 79 (15 Feb 2022 04:36) (58 - 92)  BP: 101/69 (15 Feb 2022 04:36) (96/62 - 112/78)  BP(mean): --  RR: 18 (15 Feb 2022 04:36) (18 - 18)  SpO2: 96% (15 Feb 2022 04:36) (95% - 99%)    PHYSICAL EXAM:  GENERAL: NAD, well-groomed, well-developed  HEENT : Conjuntivae  clear sclerae anicteric  NECK: Supple, No JVD, Normal thyroid  NERVOUS SYSTEM:  Alert oriented   no  focal  deficits;   CHEST/LUNG: Clear    HEART: Regular rate and rhythm; No murmurs, rubs, or gallops  ABDOMEN: Soft, Nontender, Nondistended; Bowel sounds present  EXTREMITIES:  no  edema no  tenderness  SKIN: No rashes   LABS:                        13.6   10.51 )-----------( 287      ( 15 Feb 2022 02:36 )             41.4     -15    136  |  105  |  15  ----------------------------<  101<H>  4.0   |  27  |  0.88    Ca    9.0      15 Feb 2022 02:36    TPro  7.5  /  Alb  2.8<L>  /  TBili  0.4  /  DBili  x   /  AST  18  /  ALT  44  /  AlkPhos  72  02-14    PT/INR - ( 2022 13:22 )   PT: 16.4 sec;   INR: 1.44 ratio         PTT - ( 15 Feb 2022 02:35 )  PTT:92.5 sec  Urinalysis Basic - ( 2022 11:24 )    Color: Yellow / Appearance: Clear / S.015 / pH: x  Gluc: x / Ketone: Negative  / Bili: Negative / Urobili: Negative mg/dL   Blood: x / Protein: Negative mg/dL / Nitrite: Negative   Leuk Esterase: Negative / RBC: x / WBC x   Sq Epi: x / Non Sq Epi: x / Bacteria: x      CAPILLARY BLOOD GLUCOSE          RADIOLOGY & ADDITIONAL TESTS:    Imaging reports  Personally Reviewed:  [x ] YES  [ ] NO    Consultant(s) Notes Reviewed:  [x ] YES  [ ] NO    Care Discussed with Consultants/Other Providers [x ] YES  [ ] NO  Problem/Plan - 1:  ·  Problem: Pulmonary embolism.   ·  Plan: heparin  change  to oral AC as per  pulmonary  further w/u  in  future  aftr  conclusion of  AC  course      
lying in bed    Vital Signs Last 24 Hrs  T(C): 36.4 (16 Feb 2022 04:55), Max: 37.2 (15 Feb 2022 23:42)  T(F): 97.6 (16 Feb 2022 04:55), Max: 99 (15 Feb 2022 23:42)  HR: 71 (16 Feb 2022 04:55) (71 - 93)  BP: 106/74 (16 Feb 2022 04:55) (98/67 - 109/76)  BP(mean): --  RR: 18 (16 Feb 2022 04:55) (18 - 18)  SpO2: 99% (16 Feb 2022 04:55) (97% - 99%)    PHYSICAL EXAM:    general - AAO x 3  HEENT - No Icterus  CVS - RRR  RS - AE B/L  Abd - soft, NT  Ext - Pulses +        LABS:                        14.4   8.04  )-----------( 297      ( 16 Feb 2022 09:03 )             44.0     02-16    137  |  104  |  10  ----------------------------<  85  4.1   |  30  |  0.90    Ca    9.1      16 Feb 2022 09:03    TPro  7.8  /  Alb  2.8<L>  /  TBili  0.4  /  DBili  x   /  AST  42<H>  /  ALT  91<H>  /  AlkPhos  75  02-16    PTT - ( 16 Feb 2022 09:03 )  PTT:100.3 sec        
    INTERVAL HPI/OVERNIGHT EVENTS:        REVIEW OF SYSTEMS:  CONSTITUTIONAL:  no  complaints    NECK: No pain or stiffnes  RESPIRATORY: No SOB   CARDIOVASCULAR: No chest pain, palpitations, dizziness,   GASTROINTESTINAL: No abdominal pain. No nausea, vomiting,   NEUROLOGICAL: No headaches, no  blurry  vision no  dizziness  SKIN: No itching,   MUSCULOSKELETAL: No pain    MEDICATION:  acetaminophen     Tablet .. 650 milliGRAM(s) Oral every 6 hours PRN  apixaban 10 milliGRAM(s) Oral two times a day  heparin   Injectable 8500 Unit(s) IV Push every 6 hours PRN  heparin   Injectable 4000 Unit(s) IV Push every 6 hours PRN  melatonin 3 milliGRAM(s) Oral at bedtime PRN    Vital Signs Last 24 Hrs  T(C): 36.4 (16 Feb 2022 04:55), Max: 37.2 (15 Feb 2022 23:42)  T(F): 97.6 (16 Feb 2022 04:55), Max: 99 (15 Feb 2022 23:42)  HR: 71 (16 Feb 2022 04:55) (71 - 93)  BP: 106/74 (16 Feb 2022 04:55) (98/67 - 109/76)  BP(mean): --  RR: 18 (16 Feb 2022 04:55) (18 - 18)  SpO2: 99% (16 Feb 2022 04:55) (97% - 99%)    PHYSICAL EXAM:  GENERAL: NAD, well-groomed, well-developed  HEENT : Conjuntivae  clear sclerae anicteric  NECK: Supple, No JVD, Normal thyroid  NERVOUS SYSTEM:  Alert oriented   no  focal  deficits;   CHEST/LUNG: Clear    HEART: Regular rate and rhythm; No murmurs, rubs, or gallops  ABDOMEN: Soft, Nontender, Nondistended; Bowel sounds present  EXTREMITIES:  no  edema no  tenderness  SKIN: No rashes   LABS:                        13.6   10.51 )-----------( 287      ( 15 Feb 2022 02:36 )             41.4     02-15    136  |  105  |  15  ----------------------------<  101<H>  4.0   |  27  |  0.88    Ca    9.0      15 Feb 2022 02:36      PTT - ( 15 Feb 2022 02:35 )  PTT:92.5 sec    CAPILLARY BLOOD GLUCOSE          RADIOLOGY & ADDITIONAL TESTS:    Imaging reports  Personally Reviewed:  [ ] YES  [ ] NO    Consultant(s) Notes Reviewed:  [ x] YES  [ ] NO    Care Discussed with Consultants/Other Providers [x ] YES  [ ] NO  Problem/Plan - 1:  ·  Problem: Pulmonary embolism.   ·  Plan: heparin  change  to oral AC as per pulmonary  discharge  hellen  to  follow  with  pmd  and  hematology  for  eventual w/u  of possible  hypercoagulable  state  further w/u  in  future  aftr  conclusion of  AC  course  
  INTERVAL HPI:  25 yo M w/PMH of Kidney Stones (2016), presents to the ED for right/ chest/ flank pain for the past x5 days. Pain is an intermittent, cramping pain, 6/10 in severity,  similar  to when he had kidney stones in the past. Denies any injuries, falls, or trauma. Pt took Advil for the pain last night and drank water. Denies any dysuria, hematuria, or fever, CP, SOB, or vomiting. No leg swelling, no long travel.  CTA chest showed PE in right pulmonary artery. No family history  of VTE  He is a full time student, on no regular meds.    Denies tobacco or substance abuse    OVERNIGHT EVENTS:  Awake comfortable and feels better.    Vital Signs Last 24 Hrs  T(C): 37.1 (15 Feb 2022 04:36), Max: 37.2 (2022 13:58)  T(F): 98.7 (15 Feb 2022 04:36), Max: 99 (2022 13:58)  HR: 79 (15 Feb 2022 04:36) (58 - 92)  BP: 101/69 (15 Feb 2022 04:36) (96/62 - 112/78)  BP(mean): --  RR: 18 (15 Feb 2022 04:36) (18 - 18)  SpO2: 96% (15 Feb 2022 04:36) (95% - 99%)    PHYSICAL EXAM:  GEN:         Awake, responsive and comfortable.  HEENT:    Normal.    RESP:      no wheezing  CVS:          Regular rate and rhythm.     MEDICATIONS  (STANDING):  heparin  Infusion.  Unit(s)/Hr (18 mL/Hr) IV Continuous <Continuous>    MEDICATIONS  (PRN):  acetaminophen     Tablet .. 650 milliGRAM(s) Oral every 6 hours PRN Mild Pain (1 - 3)  heparin   Injectable 8500 Unit(s) IV Push every 6 hours PRN For aPTT less than 40  heparin   Injectable 4000 Unit(s) IV Push every 6 hours PRN For aPTT between 40 - 57  melatonin 3 milliGRAM(s) Oral at bedtime PRN Insomnia    LABS:                        13.6   10.51 )-----------( 287      ( 15 Feb 2022 02:36 )             41.4     02-15    136  |  105  |  15  ----------------------------<  101<H>  4.0   |  27  |  0.88    Ca    9.0      15 Feb 2022 02:36    TPro  7.5  /  Alb  2.8<L>  /  TBili  0.4  /  DBili  x   /  AST  18  /  ALT  44  /  AlkPhos  72  02-14    PT/INR - ( 2022 13:22 )   PT: 16.4 sec;   INR: 1.44 ratio      PTT - ( 15 Feb 2022 02:35 )  PTT:92.5 sec    Urinalysis Basic - ( 2022 11:24 )    Color: Yellow / Appearance: Clear / S.015 / pH: x  Gluc: x / Ketone: Negative  / Bili: Negative / Urobili: Negative mg/dL   Blood: x / Protein: Negative mg/dL / Nitrite: Negative   Leuk Esterase: Negative / RBC: x / WBC x   Sq Epi: x / Non Sq Epi: x / Bacteria: x  < from: TTE Echo Complete w/o Contrast w/ Doppler (22 @ 11:32) >     EXAM:  ECHO TTE WO CON COMP W DOPP         PROCEDURE DATE:  2022        INTERPRETATION:  TRANSTHORACIC ECHOCARDIOGRAM REPORT        Patient Name:   EDGAR SANABRIA Patient Location: [facility] Rec #:  [id number]       Accession #:      [id number]  Account #:                       Height:           74.0 in 188.0 cm  YOB: 1995        Weight:           222.7 lb 101.00 kg  Patient Age:    26 years         BSA:              2.27 m²  Patient Gender: M                BP:     105/69 mmHg      Date of Exam:        2022 11:32:16 AM  Sonographer:         TRES  Referring Physician: SCOTT HOWELL    Procedure:   2D Echo/Doppler/Color Doppler Complete.  Indications: Other pulmonary embolism without acute cor pulmonale- I26.99  Diagnosis:   Other pulmonary embolism without acute cor pulmonale - I26.99        2D AND M-MODE MEASUREMENTS (normal ranges within parentheses):  Left                 Normal   Aorta/Left            Normal  Ventricle:                    Atrium:  IVSd (2D):    0.88  (0.7-1.1) Aortic Root    3.66  (2.4-3.7)                 cm             (2D):           cm  LVPWd (2D):   0.86  (0.7-1.1) Left Atrium    3.01  (1.9-4.0)                 cm             (2D):           cm  LVIDd (2D):   4.69  (3.4-5.7) LA Vol Index   13.2                 cm             (A4C):        ml/m²  LVIDs (2D):   2.64            LA Vol Index   12.2                 cm             (A2C):        ml/m²  LV FS (2D):   43.6   (>25%)   LA Vol Index   13.8                  %           (BP):         ml/m²  Relative Wall 0.37   (<0.42)  Right  Thickness                     Ventricle:                                TAPSE:          1.44 cm    LV DIASTOLIC FUNCTION:  MV Peak E: 0.57 m/s Decel Time:  211 msec  MV Peak A: 0.47m/s Septal E/e'  6.4  E/A Ratio: 1.23     Lateral E/e' 3.3  Septal e'  0.1 m/s  Lateral e' 0.2 m/s    SPECTRAL DOPPLER ANALYSIS (where applicable):  Mitral Valve:  MV P1/2 Time: 61.29 msec  MV Area, PHT: 3.59 cm²    Aortic Valve: AoV Max Davide: 1.30 m/s AoV Peak P.8 mmHg AoV Mean PG:   3.9 mmHg    LVOT Vmax: 1.16 m/s LVOT VTI: 0.177 m LVOT Diameter: 2.21 cm    AoV Area, Vmax: 3.41 cm² AoV Area, VTI: 3.45 cm² AoV Area, Vmn: 2.93 cm²    Tricuspid Valve and PA/RV Systolic Pressure: TR Max Velocity: 2.49 m/s RA   Pressure: 5 mmHg RVSP/PASP: 29.7 mmHg      PHYSICIAN INTERPRETATION:  Left Ventricle: Normal left ventricular size and wall thicknesses, with   normal systolic and diastolic function.  Global LV systolic function was normal. Left ventricular ejection   fraction, by visual estimation, is 60 to 65%.  Right Ventricle: Normal right ventricular size and function.  Left Atrium: The left atrium is normal in size.  Right Atrium: The right atrium is normal in size.  Pericardium: There is noevidence of pericardial effusion.  Mitral Valve: Structurally normal mitral valve, with normal leaflet   excursion. No evidence of mitral valve regurgitation is seen.  Tricuspid Valve: Structurally normal tricuspid valve, with normal leaflet   excursion. Mild tricuspid regurgitation is visualized.  Aortic Valve: Normal trileaflet aortic valve with normal opening. Peak   transaortic gradient equals 6.8 mmHg, mean transaortic gradient equals   3.9 mmHg, the calculated aortic valve area equals 3.45 cm² by the   continuity equation consistent with normally opening aortic valve. No   evidence of aortic valve regurgitation is seen.  Pulmonic Valve: Structurally normal pulmonic valve, with normal leaflet   excursion. Trace pulmonic valve regurgitation.  Aorta: The aortic root and ascending aorta are structurally normal, with   no evidence of dilitation.  Pulmonary Artery: The main pulmonary artery is normal in size.  Venous: The inferior vena cava was normal sized, with respiratory size   variation greater than 50%.      Summary:   1. Left ventricular ejection fraction, by visual estimation, is 60 to   65%.   2. Normal global left ventricular systolic function.   3. No evidence of mitral valve regurgitation.   4. Mild tricuspid regurgitation.      [id number] Freddy Benitez MD, [facility]  Electronically signed on 2022 at 2:46:40 PM    FREDDY BENITEZ MD; Attending Cardiologist  This document has been electronically signed. 2022  2:46PM    ASSESSMENT AND PLAN:  ·	Chest pain.  ·	Large right pulmonary embolism.  ·	Nephrolithiasis    ProBNP normal.  Echo noted.  Can change to Eliquis/Xarelto and start discharge planning.  Hyper coagulable work up per Hematology.  
lying in bed    Vital Signs Last 24 Hrs  T(C): 37.1 (15 Feb 2022 04:36), Max: 37.2 (2022 13:58)  T(F): 98.7 (15 Feb 2022 04:36), Max: 99 (2022 13:58)  HR: 79 (15 Feb 2022 04:36) (58 - 92)  BP: 101/69 (15 Feb 2022 04:36) (96/62 - 112/78)  BP(mean): --  RR: 18 (15 Feb 2022 04:36) (18 - 18)  SpO2: 96% (15 Feb 2022 04:36) (95% - 99%)    PHYSICAL EXAM:    general - AAO x 3  HEENT - No Icterus  CVS - RRR  RS - AE B/L  Abd - soft, NT  Ext - Pulses +        LABS:                        13.6   10.51 )-----------( 287      ( 15 Feb 2022 02:36 )             41.4     02-15    136  |  105  |  15  ----------------------------<  101<H>  4.0   |  27  |  0.88    Ca    9.0      15 Feb 2022 02:36    TPro  7.5  /  Alb  2.8<L>  /  TBili  0.4  /  DBili  x   /  AST  18  /  ALT  44  /  AlkPhos  72  02-14    PT/INR - ( 2022 13:22 )   PT: 16.4 sec;   INR: 1.44 ratio         PTT - ( 15 Feb 2022 02:35 )  PTT:92.5 sec  Urinalysis Basic - ( 2022 11:24 )    Color: Yellow / Appearance: Clear / S.015 / pH: x  Gluc: x / Ketone: Negative  / Bili: Negative / Urobili: Negative mg/dL   Blood: x / Protein: Negative mg/dL / Nitrite: Negative   Leuk Esterase: Negative / RBC: x / WBC x   Sq Epi: x / Non Sq Epi: x / Bacteria: x

## 2022-02-16 NOTE — DISCHARGE NOTE PROVIDER - NSDCMRMEDTOKEN_GEN_ALL_CORE_FT
apixaban 5 mg oral tablet: 2 tab(s) orally 2 times a day   apixaban 5 mg oral tablet: 1 tab(s) orally 2 times a day

## 2022-02-16 NOTE — DISCHARGE NOTE PROVIDER - HOSPITAL COURSE
patient  treated  with  iv  heparin monitored  on  telemetry no  arrythmias  no  sob  no cp no palpitations  no  abd  pain no  ha   evaluated  by  pulmonary  and  hematology   tte  venous  doppler  negative  no  evidence of  bleeding    changed  to  oral  AC  as per  pulmonary  recommendations  to  have  Eliquis 10 mg  2x/day  for one week  then 5mg 2x/day for  3 to 6  months    to  follow  with  pmd pulmonary and  hematology    to  call me  if  any  further  questions

## 2022-02-16 NOTE — CHART NOTE - NSCHARTNOTEFT_GEN_A_CORE
SICK LETTER  To Whom It May Concern,       	Please excuse Mr. Isabel, :[birth year]; as he was sick on 22 to 22. Patient was seen and treated in [facility].   Any further questions or concerns please use contact info below.      Viki Hopkins NP  Internal Medicine  [address] [place] [id number] [phone]

## 2022-02-16 NOTE — PROGRESS NOTE ADULT - PROBLEM SELECTOR PLAN 1
-  - Unprovoked PE  - Anticoagulation to change over to DOAC  - we will not be able to follow patient as outpatient   - pulmonary f/u  - d/w Dr Ramesh
- Unprovoked PE  - Anticoagulation to change over to DOAC  - we will not be able to follow patient as outpatient   - pulmonary f/u

## 2022-02-18 DIAGNOSIS — I26.93 SINGLE SUBSEGMENTAL PULMONARY EMBOLISM WITHOUT ACUTE COR PULMONALE: ICD-10-CM

## 2022-02-18 DIAGNOSIS — N20.0 CALCULUS OF KIDNEY: ICD-10-CM

## 2022-06-13 NOTE — ED ADULT TRIAGE NOTE - TEMPERATURE IN FAHRENHEIT (DEGREES F)
98.5 Island Pedicle Flap-Requiring Vessel Identification Text: The defect edges were debeveled with a #15 scalpel blade.  Given the location of the defect, shape of the defect and the proximity to free margins an island pedicle advancement flap was deemed most appropriate.  Using a sterile surgical marker, an appropriate advancement flap was drawn, based on the axial vessel mentioned above, incorporating the defect, outlining the appropriate donor tissue and placing the expected incisions within the relaxed skin tension lines where possible.    The area thus outlined was incised deep to adipose tissue with a #15 scalpel blade.  The skin margins were undermined to an appropriate distance in all directions around the primary defect and laterally outward around the island pedicle utilizing iris scissors.  There was minimal undermining beneath the pedicle flap.

## 2024-01-29 ENCOUNTER — NON-APPOINTMENT (OUTPATIENT)
Age: 29
End: 2024-01-29
